# Patient Record
Sex: MALE | Race: WHITE | NOT HISPANIC OR LATINO | Employment: OTHER | URBAN - METROPOLITAN AREA
[De-identification: names, ages, dates, MRNs, and addresses within clinical notes are randomized per-mention and may not be internally consistent; named-entity substitution may affect disease eponyms.]

---

## 2017-04-05 ENCOUNTER — OFFICE VISIT (OUTPATIENT)
Dept: DERMATOLOGY | Facility: CLINIC | Age: 82
End: 2017-04-05
Payer: MEDICARE

## 2017-04-05 DIAGNOSIS — L21.9 SEBORRHEIC DERMATITIS, UNSPECIFIED: ICD-10-CM

## 2017-04-05 DIAGNOSIS — L57.0 AK (ACTINIC KERATOSIS): ICD-10-CM

## 2017-04-05 DIAGNOSIS — L82.1 SK (SEBORRHEIC KERATOSIS): Primary | ICD-10-CM

## 2017-04-05 PROCEDURE — 1157F ADVNC CARE PLAN IN RCRD: CPT | Mod: S$GLB,,, | Performed by: DERMATOLOGY

## 2017-04-05 PROCEDURE — 1160F RVW MEDS BY RX/DR IN RCRD: CPT | Mod: S$GLB,,, | Performed by: DERMATOLOGY

## 2017-04-05 PROCEDURE — 99202 OFFICE O/P NEW SF 15 MIN: CPT | Mod: S$GLB,,, | Performed by: DERMATOLOGY

## 2017-04-05 PROCEDURE — 1159F MED LIST DOCD IN RCRD: CPT | Mod: S$GLB,,, | Performed by: DERMATOLOGY

## 2017-04-05 PROCEDURE — 99999 PR PBB SHADOW E&M-NEW PATIENT-LVL III: CPT | Mod: PBBFAC,,, | Performed by: DERMATOLOGY

## 2017-04-05 RX ORDER — HYDROCORTISONE 25 MG/G
CREAM TOPICAL DAILY
Qty: 45 G | Refills: 1 | Status: SHIPPED | OUTPATIENT
Start: 2017-04-05 | End: 2017-04-15

## 2017-04-05 RX ORDER — DIVALPROEX SODIUM 125 MG/1
CAPSULE, COATED PELLETS ORAL
COMMUNITY
Start: 2017-03-24

## 2017-04-05 RX ORDER — KETOCONAZOLE 20 MG/ML
SHAMPOO, SUSPENSION TOPICAL
COMMUNITY
Start: 2017-03-04

## 2017-04-05 RX ORDER — AMOXICILLIN AND CLAVULANATE POTASSIUM 875; 125 MG/1; MG/1
TABLET, FILM COATED ORAL
COMMUNITY
Start: 2017-03-22

## 2017-04-05 RX ORDER — DULOXETIN HYDROCHLORIDE 20 MG/1
CAPSULE, DELAYED RELEASE ORAL
COMMUNITY
Start: 2017-03-13

## 2017-04-05 RX ORDER — QUETIAPINE FUMARATE 25 MG/1
TABLET, FILM COATED ORAL
COMMUNITY
Start: 2017-03-31

## 2017-04-05 RX ORDER — DONEPEZIL HYDROCHLORIDE 10 MG/1
TABLET, FILM COATED ORAL
COMMUNITY
Start: 2017-03-24

## 2017-04-05 RX ORDER — DULOXETIN HYDROCHLORIDE 60 MG/1
CAPSULE, DELAYED RELEASE ORAL
COMMUNITY
Start: 2017-02-05

## 2017-04-05 RX ORDER — ALBUTEROL SULFATE 0.83 MG/ML
SOLUTION RESPIRATORY (INHALATION)
COMMUNITY
Start: 2017-03-22

## 2017-04-05 RX ORDER — KETOCONAZOLE 20 MG/ML
SHAMPOO, SUSPENSION TOPICAL
Qty: 120 ML | Refills: 5 | Status: SHIPPED | OUTPATIENT
Start: 2017-04-05

## 2017-04-05 RX ORDER — DESONIDE 0.5 MG/G
CREAM TOPICAL
COMMUNITY
Start: 2017-02-06

## 2017-04-05 RX ORDER — AZITHROMYCIN MONOHYDRATE 10 MG/ML
SOLUTION/ DROPS OPHTHALMIC
COMMUNITY
Start: 2017-03-30

## 2017-04-05 NOTE — PROGRESS NOTES
Subjective:       Patient ID:  Bharath Wilson is a 89 y.o. male who presents for   Chief Complaint   Patient presents with    Lesion     HPI Comments: Pt c/o scaly lesion on scalp and face x a few years. H/o precancers on scalp and face tx with cryotherapy in the past. Unsure if skin cancer in past. Tx with ketoconazole shampoo qweek on scalp and desonide cream on face twice weekly still with scaly itchy spots to scalp and face, previously used daily without better results.    Lesion         Review of Systems   Constitutional: Negative for fever and chills.   Skin: Positive for dry skin. Negative for itching, rash, daily sunscreen use and activity-related sunscreen use.   Hematologic/Lymphatic: Does not bruise/bleed easily.        Objective:    Physical Exam   Constitutional: He appears well-developed and well-nourished. He is in a wheelchair.  No distress.   Neurological: He is disoriented.   Psychiatric: He is agitated.   Skin:   Areas Examined (abnormalities noted in diagram):   Scalp / Hair Palpated and Inspected  Head / Face Inspection Performed  Neck Inspection Performed  Chest / Axilla Inspection Performed  Abdomen Inspection Performed  Back Inspection Performed  RUE Inspected  LUE Inspection Performed                   Diagram Legend     Erythematous scaling macule/papule c/w actinic keratosis       Vascular papule c/w angioma      Pigmented verrucoid papule/plaque c/w seborrheic keratosis      Yellow umbilicated papule c/w sebaceous hyperplasia      Irregularly shaped tan macule c/w lentigo     1-2 mm smooth white papules consistent with Milia      Movable subcutaneous cyst with punctum c/w epidermal inclusion cyst      Subcutaneous movable cyst c/w pilar cyst      Firm pink to brown papule c/w dermatofibroma      Pedunculated fleshy papule(s) c/w skin tag(s)      Evenly pigmented macule c/w junctional nevus     Mildly variegated pigmented, slightly irregular-bordered macule c/w mildly atypical nevus       Flesh colored to evenly pigmented papule c/w intradermal nevus       Pink pearly papule/plaque c/w basal cell carcinoma      Erythematous hyperkeratotic cursted plaque c/w SCC      Surgical scar with no sign of skin cancer recurrence      Open and closed comedones      Inflammatory papules and pustules      Verrucoid papule consistent consistent with wart     Erythematous eczematous patches and plaques     Dystrophic onycholytic nail with subungual debris c/w onychomycosis     Umbilicated papule    Erythematous-base heme-crusted tan verrucoid plaque consistent with inflamed seborrheic keratosis     Erythematous Silvery Scaling Plaque c/w Psoriasis     See annotation      Assessment / Plan:        SK (seborrheic keratosis)  These are benign inherited growths without a malignant potential. Reassurance given to patient. No treatment is necessary.     AK (actinic keratosis)  Pt did not tolerate cryo today  As these lesions are low grade pre cancerous, we will monitor for changes in them   Wife was present today and agrees with plan   Brochure provided    Seborrheic dermatitis, unspecified  Continue ketoconazole shampoo 2% q week   Hydrocortisone cream 2.5% qd to face and scalp prn scaling and itching.            Return in about 6 months (around 10/5/2017).